# Patient Record
Sex: MALE | Race: WHITE | Employment: FULL TIME | URBAN - METROPOLITAN AREA
[De-identification: names, ages, dates, MRNs, and addresses within clinical notes are randomized per-mention and may not be internally consistent; named-entity substitution may affect disease eponyms.]

---

## 2017-05-09 ENCOUNTER — APPOINTMENT (OUTPATIENT)
Dept: OTHER | Facility: HOSPITAL | Age: 42
End: 2017-05-09
Attending: ORTHOPAEDIC SURGERY

## 2017-05-17 ENCOUNTER — OFFICE VISIT (OUTPATIENT)
Dept: SURGERY | Facility: CLINIC | Age: 42
End: 2017-05-17

## 2017-05-17 VITALS
SYSTOLIC BLOOD PRESSURE: 120 MMHG | HEART RATE: 80 BPM | BODY MASS INDEX: 26.41 KG/M2 | WEIGHT: 195 LBS | RESPIRATION RATE: 16 BRPM | DIASTOLIC BLOOD PRESSURE: 80 MMHG | HEIGHT: 72 IN

## 2017-05-17 DIAGNOSIS — M50.10 HERNIATION OF CERVICAL INTERVERTEBRAL DISC WITH RADICULOPATHY: Primary | ICD-10-CM

## 2017-05-17 PROCEDURE — 99204 OFFICE O/P NEW MOD 45 MIN: CPT | Performed by: PHYSICIAN ASSISTANT

## 2017-05-17 RX ORDER — ASCORBIC ACID 500 MG
500 TABLET ORAL DAILY
COMMUNITY

## 2017-05-17 RX ORDER — METHYLPREDNISOLONE 4 MG/1
TABLET ORAL
Qty: 1 PACKAGE | Refills: 0 | Status: SHIPPED | OUTPATIENT
Start: 2017-05-17 | End: 2017-06-01 | Stop reason: ALTCHOICE

## 2017-05-17 NOTE — PROGRESS NOTES
Location of Pain: neck, r shoulder, radiating down arm into fingers    Date Pain Began: Jan 2017         Work Related:   No        Receiving Work Comp/Disability:   Yes    Numeric Rating Scale:  Pain at Present:

## 2017-05-17 NOTE — PATIENT INSTRUCTIONS
Refill policies:    • Allow 2 business days for refills; controlled substances may take longer.   • Contact your pharmacy at least 5 days prior to running out of medication and have them send an electronic request or submit request through the “request re insurance carrier to obtain pre-certification or prior authorization. Unfortunately, [facility] has seen an increase in denial of payment even though the procedure/test has been pre-certified.   You are strongly encouraged to contact your insurance carrier to v

## 2017-05-17 NOTE — H&P
[facility] Neurosurgery Consultation      HISTORY OF PRESENT Kristin Patton is a 37year old left handed male here for a cervical consultation. Gives a history of working for a [email or web address].   He was doing a mandatory treadmill stress angie reports that he has never smoked. He does not have any smokeless tobacco history on file. He reports that he does not use illicit drugs.     ALLERGIES:    Penicillins                 MEDICATIONS:    Current Outpatient Prescriptions on File Prior to Visit: IMAGING:  MRI cervical spine 4/5/2017        ASSESSMENT:  1. Acute right C6 radiculopathy secondary to an injury January 24, 2017  2. Right C5-6 disc herniation and disc osteophyte complex    PLAN:  1. Off work  2. Medrol Dosepak  3.   Physical therapy

## 2017-05-22 ENCOUNTER — TELEPHONE (OUTPATIENT)
Dept: SURGERY | Facility: CLINIC | Age: 42
End: 2017-05-22

## 2017-05-24 ENCOUNTER — HOSPITAL ENCOUNTER (OUTPATIENT)
Dept: GENERAL RADIOLOGY | Facility: HOSPITAL | Age: 42
Discharge: HOME | End: 2017-05-24
Attending: PHYSICIAN ASSISTANT
Payer: OTHER MISCELLANEOUS

## 2017-05-24 DIAGNOSIS — M50.10 HERNIATION OF CERVICAL INTERVERTEBRAL DISC WITH RADICULOPATHY: ICD-10-CM

## 2017-05-24 PROCEDURE — 72052 X-RAY EXAM NECK SPINE 6/>VWS: CPT | Performed by: PHYSICIAN ASSISTANT

## 2017-06-01 ENCOUNTER — TELEPHONE (OUTPATIENT)
Dept: SURGERY | Facility: CLINIC | Age: 42
End: 2017-06-01

## 2017-06-01 ENCOUNTER — OFFICE VISIT (OUTPATIENT)
Dept: SURGERY | Facility: CLINIC | Age: 42
End: 2017-06-01

## 2017-06-01 VITALS — RESPIRATION RATE: 16 BRPM | HEART RATE: 88 BPM | SYSTOLIC BLOOD PRESSURE: 120 MMHG | DIASTOLIC BLOOD PRESSURE: 80 MMHG

## 2017-06-01 DIAGNOSIS — M54.12 CERVICAL RADICULOPATHY: Primary | ICD-10-CM

## 2017-06-01 PROCEDURE — 99213 OFFICE O/P EST LOW 20 MIN: CPT | Performed by: PHYSICIAN ASSISTANT

## 2017-06-01 RX ORDER — OMEGA-3 FATTY ACIDS/FISH OIL 300-1000MG
200 CAPSULE ORAL DAILY PRN
COMMUNITY

## 2017-06-01 NOTE — PATIENT INSTRUCTIONS
Refill policies:    • Allow 2-3 business days for refills; controlled substances may take longer.   • Contact your pharmacy at least 5 days prior to running out of medication and have them send an electronic request or submit request through the [facility] have a procedure or additional testing performed. [facility]) will contact your insurance carrier to obtain pre-certification or prior authorization.     Unfortunately, [facility] has seen an increase in denial of payment even though the p

## 2017-06-01 NOTE — PROGRESS NOTES
Neurosurgery Clinic Visit  2017    Abdulaziz Sung     1975 MRN [id number]       CC: Neck Pain    HPI:    Patient improving with PT.   He has tried manual traction and medrol as well, not sure what is helping the most.  Pain is more isolated in th She had an MRI of his cervical spine but no x-rays.  He has not had any oral steroids, cervical traction, or epidurals.  He recently was taken off of work. PAST MEDICAL HISTORY:  History reviewed. No pertinent past medical history.     PAST SURGICAL HIST    Iliopsoas   Hamstrings    Quads     D-flexion  P-flexion  Eversion    Right        5          5        5          5  5      Left        5          5        5          5  5        Reflexes DTRs:       Biceps    Brachioradialis    Triceps     Patellar

## 2017-06-02 ENCOUNTER — TELEPHONE (OUTPATIENT)
Dept: SURGERY | Facility: CLINIC | Age: 42
End: 2017-06-02

## 2017-06-05 ENCOUNTER — TELEPHONE (OUTPATIENT)
Dept: SURGERY | Facility: CLINIC | Age: 42
End: 2017-06-05

## 2017-06-26 ENCOUNTER — TELEPHONE (OUTPATIENT)
Dept: SURGERY | Facility: CLINIC | Age: 42
End: 2017-06-26

## 2017-06-26 ENCOUNTER — OFFICE VISIT (OUTPATIENT)
Dept: SURGERY | Facility: CLINIC | Age: 42
End: 2017-06-26

## 2017-06-26 VITALS — DIASTOLIC BLOOD PRESSURE: 80 MMHG | SYSTOLIC BLOOD PRESSURE: 124 MMHG | HEART RATE: 68 BPM

## 2017-06-26 DIAGNOSIS — M54.12 CERVICAL RADICULOPATHY: Primary | ICD-10-CM

## 2017-06-26 PROCEDURE — 99213 OFFICE O/P EST LOW 20 MIN: CPT | Performed by: PHYSICIAN ASSISTANT

## 2017-06-26 NOTE — TELEPHONE ENCOUNTER
Letter reprinted, will discuss with PA about RTW with no restrictions.   Spoke with patient, he states RTW note is okay, the issue is the office visit note states return on trial basis which his employer will not accept  Transferred call to [place], [place] to s

## 2017-06-26 NOTE — PATIENT INSTRUCTIONS
Refill policies:    • Allow 2-3 business days for refills; controlled substances may take longer.   • Contact your pharmacy at least 5 days prior to running out of medication and have them send an electronic request or submit request through the [facility] have a procedure or additional testing performed. [facility]) will contact your insurance carrier to obtain pre-certification or prior authorization.     Unfortunately, [facility] has seen an increase in denial of payment even though the p

## 2017-06-26 NOTE — PROGRESS NOTES
Neurosurgery Clinic Visit  2017    Daria Aldana     1975 MRN [id number]       CC: Neck Pain    HPI: He returns today in follow-up. He has been in physical therapy, he has made objective gains in therapy.   Today he states his right-sided neck It is in this capacity that he is here for evaluation.  He states he has cervical pain which is a 2-6/10.  He has pain radiating into the right scapula.  He denies any pain into the left scapula.  He has pain it radiates down the lateral aspect of the righ NEUROLOGICAL:  This patient is alert and orientated x 3.  Speech fluent. Comprehension intact Face is symmetrical.     SPINE: Mild  neck pain on extension only. Sensation to light touch is intact bilateral in both arms and legs. No cervical spasms.  Gait i 3.  If he tolerates returning to work we will see him back for a reevaluation in 4 weeks. Patient voiced understanding and is in agreement with this plan. 4.  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   Follow-up in 1

## 2017-06-26 NOTE — PROGRESS NOTES
Pt states he is feeling better, numbness, tingling and pain radiating down right arm have all gone.   Some minor neck ache

## 2017-07-31 ENCOUNTER — OFFICE VISIT (OUTPATIENT)
Dept: SURGERY | Facility: CLINIC | Age: 42
End: 2017-07-31

## 2017-07-31 DIAGNOSIS — M54.12 CERVICAL RADICULOPATHY: Primary | ICD-10-CM

## 2017-07-31 DIAGNOSIS — M50.10 HERNIATION OF CERVICAL INTERVERTEBRAL DISC WITH RADICULOPATHY: ICD-10-CM

## 2017-07-31 PROCEDURE — 99212 OFFICE O/P EST SF 10 MIN: CPT | Performed by: PHYSICIAN ASSISTANT

## 2017-07-31 NOTE — PATIENT INSTRUCTIONS
Refill policies:    • Allow 2-3 business days for refills; controlled substances may take longer.   • Contact your pharmacy at least 5 days prior to running out of medication and have them send an electronic request or submit request through the [facility] have a procedure or additional testing performed. [facility]) will contact your insurance carrier to obtain pre-certification or prior authorization.     Unfortunately, [facility] has seen an increase in denial of payment even though the p

## 2017-07-31 NOTE — PROGRESS NOTES
Neurosurgery Clinic Visit      Douglas Luke     1975 MRN [id number]       HPI: He returns today in follow-up. Since his last visit he did return to work full duty as a . He has had no trouble with his job.   He has been working out to The next morning he had very stiff neck then this proceeded into his neck pain right scapular pain and numbness and tingling down the right arm.     It is in this capacity that he is here for evaluation.  He states he has cervical pain which is a 2-6/10.  H GENERAL:  Patient is in no acute distress. HEENT:  Normocephalic, atraumatic  SKIN: Warm, dry, no rashes. NEUROLOGICAL:  This patient is alert and orientated x 3.  Speech fluent.  Comprehension intact Face is symmetrical.     SPINE: Mild  neck pain on e Christoph Chong M.S., PA-C  [facility]  [address] [facility], [address] [facility]  [address] [place] [address], [address] [place] [address] Rd  [phone]

## 2017-08-01 ENCOUNTER — TELEPHONE (OUTPATIENT)
Dept: SURGERY | Facility: CLINIC | Age: 42
End: 2017-08-01

## 2017-10-18 ENCOUNTER — TELEPHONE (OUTPATIENT)
Dept: SURGERY | Facility: CLINIC | Age: 42
End: 2017-10-18

## 2017-11-01 ENCOUNTER — OFFICE VISIT (OUTPATIENT)
Dept: SURGERY | Facility: CLINIC | Age: 42
End: 2017-11-01

## 2017-11-01 VITALS — HEART RATE: 64 BPM | SYSTOLIC BLOOD PRESSURE: 120 MMHG | DIASTOLIC BLOOD PRESSURE: 70 MMHG

## 2017-11-01 DIAGNOSIS — M50.10 HERNIATION OF CERVICAL INTERVERTEBRAL DISC WITH RADICULOPATHY: Primary | ICD-10-CM

## 2017-11-01 PROCEDURE — 99212 OFFICE O/P EST SF 10 MIN: CPT | Performed by: PHYSICIAN ASSISTANT

## 2017-11-01 NOTE — PROGRESS NOTES
Neurosurgery Clinic Visit      Paddy Culp     1975 MRN [id number]       HPI: He returns today in follow-up. He has returned to work as of his been exercising occasionally on his own.   Overall he is doing well he gets occasional achiness and sor Gives a history of working for [employer] He was doing a mandatory treadmill stress test on or about January 24, 2017.  While doing the stress test about 10 minutes into it he started getting a clicking in his neck and a popping he did n Current Outpatient Prescriptions on File Prior to Visit:  Multiple Vitamin (MULTI-VITAMIN OR)  None Entered  Disp:   Rfl:         PHYSICAL EXAMINATION: Last exam  GENERAL:  Patient is in no acute distress.   HEENT:  Normocephalic, atraumatic  SKIN: Warm,

## 2017-11-01 NOTE — PATIENT INSTRUCTIONS
Refill policies:    • Allow 2-3 business days for refills; controlled substances may take longer.   • Contact your pharmacy at least 5 days prior to running out of medication and have them send an electronic request or submit request through the [facility] have a procedure or additional testing performed. [facility]) will contact your insurance carrier to obtain pre-certification or prior authorization.     Unfortunately, [facility] has seen an increase in denial of payment even though the p

## 2020-01-02 ENCOUNTER — HOSPITAL ENCOUNTER (OUTPATIENT)
Dept: CT IMAGING | Facility: HOSPITAL | Age: 45
Discharge: HOME | End: 2020-01-02
Attending: INTERNAL MEDICINE

## 2020-01-02 DIAGNOSIS — Z13.9 ENCOUNTER FOR SCREENING: ICD-10-CM

## 2023-06-12 ENCOUNTER — LAB ENCOUNTER (OUTPATIENT)
Dept: LAB | Age: 48
End: 2023-06-12
Attending: FAMILY MEDICINE
Payer: COMMERCIAL

## 2023-06-12 ENCOUNTER — OFFICE VISIT (OUTPATIENT)
Dept: FAMILY MEDICINE CLINIC | Facility: CLINIC | Age: 48
End: 2023-06-12
Payer: COMMERCIAL

## 2023-06-12 VITALS
DIASTOLIC BLOOD PRESSURE: 72 MMHG | OXYGEN SATURATION: 98 % | HEART RATE: 81 BPM | TEMPERATURE: 98 F | HEIGHT: 72 IN | BODY MASS INDEX: 26.01 KG/M2 | WEIGHT: 192 LBS | RESPIRATION RATE: 16 BRPM | SYSTOLIC BLOOD PRESSURE: 122 MMHG

## 2023-06-12 DIAGNOSIS — Z00.00 WELLNESS EXAMINATION: ICD-10-CM

## 2023-06-12 DIAGNOSIS — K46.9 HERNIA: ICD-10-CM

## 2023-06-12 DIAGNOSIS — D22.9 SKIN MOLE: ICD-10-CM

## 2023-06-12 DIAGNOSIS — G89.29 CHRONIC BILATERAL LOW BACK PAIN WITHOUT SCIATICA: ICD-10-CM

## 2023-06-12 DIAGNOSIS — M54.50 CHRONIC BILATERAL LOW BACK PAIN WITHOUT SCIATICA: ICD-10-CM

## 2023-06-12 DIAGNOSIS — R39.198 DECREASED URINE STREAM: Primary | ICD-10-CM
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
## 2023-06-12 DIAGNOSIS — Z12.11 SCREENING FOR COLON CANCER: ICD-10-CM

## 2023-06-12 DIAGNOSIS — R39.198 DECREASED URINE STREAM: ICD-10-CM

## 2023-06-12 LAB
ALBUMIN SERPL-MCNC: 3.9 G/DL (ref 3.4–5)
ALBUMIN/GLOB SERPL: 1.2 {RATIO} (ref 1–2)
ALP LIVER SERPL-CCNC: 60 U/L
ALT SERPL-CCNC: 32 U/L
ANION GAP SERPL CALC-SCNC: 2 MMOL/L (ref 0–18)
APPEARANCE: CLEAR
AST SERPL-CCNC: 31 U/L (ref 15–37)
BASOPHILS # BLD AUTO: 0.03 X10(3) UL (ref 0–0.2)
BASOPHILS NFR BLD AUTO: 0.7 %
BILIRUB SERPL-MCNC: 0.4 MG/DL (ref 0.1–2)
BILIRUBIN: NEGATIVE
BUN BLD-MCNC: 14 MG/DL (ref 7–18)
CALCIUM BLD-MCNC: 9.2 MG/DL (ref 8.5–10.1)
CHLORIDE SERPL-SCNC: 107 MMOL/L (ref 98–112)
CHOLEST SERPL-MCNC: 172 MG/DL (ref ?–200)
CO2 SERPL-SCNC: 28 MMOL/L (ref 21–32)
COMPLEXED PSA SERPL-MCNC: 0.43 NG/ML (ref ?–4)
CREAT BLD-MCNC: 1.22 MG/DL
EOSINOPHIL # BLD AUTO: 0.04 X10(3) UL (ref 0–0.7)
EOSINOPHIL NFR BLD AUTO: 0.9 %
ERYTHROCYTE [DISTWIDTH] IN BLOOD BY AUTOMATED COUNT: 12.2 %
FASTING PATIENT LIPID ANSWER: NO
FASTING STATUS PATIENT QL REPORTED: NO
GFR SERPLBLD BASED ON 1.73 SQ M-ARVRAT: 74 ML/MIN/1.73M2 (ref 60–?)
GLOBULIN PLAS-MCNC: 3.3 G/DL (ref 2.8–4.4)
GLUCOSE (URINE DIPSTICK): NEGATIVE MG/DL
GLUCOSE BLD-MCNC: 96 MG/DL (ref 70–99)
HCT VFR BLD AUTO: 45.3 %
HDLC SERPL-MCNC: 53 MG/DL (ref 40–59)
HGB BLD-MCNC: 14.8 G/DL
IMM GRANULOCYTES # BLD AUTO: 0.01 X10(3) UL (ref 0–1)
IMM GRANULOCYTES NFR BLD: 0.2 %
KETONES (URINE DIPSTICK): NEGATIVE MG/DL
LDLC SERPL CALC-MCNC: 103 MG/DL (ref ?–100)
LEUKOCYTES: NEGATIVE
LYMPHOCYTES # BLD AUTO: 1.28 X10(3) UL (ref 1–4)
LYMPHOCYTES NFR BLD AUTO: 28.8 %
MCH RBC QN AUTO: 29.9 PG (ref 26–34)
MCHC RBC AUTO-ENTMCNC: 32.7 G/DL (ref 31–37)
MCV RBC AUTO: 91.5 FL
MONOCYTES # BLD AUTO: 0.32 X10(3) UL (ref 0.1–1)
MONOCYTES NFR BLD AUTO: 7.2 %
NEUTROPHILS # BLD AUTO: 2.77 X10 (3) UL (ref 1.5–7.7)
NEUTROPHILS # BLD AUTO: 2.77 X10(3) UL (ref 1.5–7.7)
NEUTROPHILS NFR BLD AUTO: 62.2 %
NITRITE, URINE: NEGATIVE
NONHDLC SERPL-MCNC: 119 MG/DL (ref ?–130)
OCCULT BLOOD: NEGATIVE
OSMOLALITY SERPL CALC.SUM OF ELEC: 284 MOSM/KG (ref 275–295)
PH, URINE: 7 (ref 4.5–8)
PLATELET # BLD AUTO: 161 10(3)UL (ref 150–450)
POTASSIUM SERPL-SCNC: 4.3 MMOL/L (ref 3.5–5.1)
PROT SERPL-MCNC: 7.2 G/DL (ref 6.4–8.2)
PROTEIN (URINE DIPSTICK): NEGATIVE MG/DL
RBC # BLD AUTO: 4.95 X10(6)UL
SODIUM SERPL-SCNC: 137 MMOL/L (ref 136–145)
SPECIFIC GRAVITY: 1.02 (ref 1–1.03)
TRIGL SERPL-MCNC: 84 MG/DL (ref 30–149)
TSI SER-ACNC: 1.56 MIU/ML (ref 0.36–3.74)
URINE-COLOR: YELLOW
UROBILINOGEN,SEMI-QN: 0.2 MG/DL (ref 0–1.9)
VIT D+METAB SERPL-MCNC: 37.3 NG/ML (ref 30–100)
VLDLC SERPL CALC-MCNC: 14 MG/DL (ref 0–30)
WBC # BLD AUTO: 4.5 X10(3) UL (ref 4–11)

## 2023-06-12 PROCEDURE — 84443 ASSAY THYROID STIM HORMONE: CPT

## 2023-06-12 PROCEDURE — 3078F DIAST BP <80 MM HG: CPT | Performed by: FAMILY MEDICINE

## 2023-06-12 PROCEDURE — 80061 LIPID PANEL: CPT

## 2023-06-12 PROCEDURE — 83036 HEMOGLOBIN GLYCOSYLATED A1C: CPT

## 2023-06-12 PROCEDURE — 3074F SYST BP LT 130 MM HG: CPT | Performed by: FAMILY MEDICINE

## 2023-06-12 PROCEDURE — 82306 VITAMIN D 25 HYDROXY: CPT

## 2023-06-12 PROCEDURE — 87086 URINE CULTURE/COLONY COUNT: CPT | Performed by: FAMILY MEDICINE

## 2023-06-12 PROCEDURE — 36415 COLL VENOUS BLD VENIPUNCTURE: CPT

## 2023-06-12 PROCEDURE — 3008F BODY MASS INDEX DOCD: CPT | Performed by: FAMILY MEDICINE

## 2023-06-12 PROCEDURE — 99214 OFFICE O/P EST MOD 30 MIN: CPT | Performed by: FAMILY MEDICINE

## 2023-06-12 PROCEDURE — 85025 COMPLETE CBC W/AUTO DIFF WBC: CPT

## 2023-06-12 PROCEDURE — 80053 COMPREHEN METABOLIC PANEL: CPT

## 2023-06-13 LAB
EST. AVERAGE GLUCOSE BLD GHB EST-MCNC: 117 MG/DL (ref 68–126)
HBA1C MFR BLD: 5.7 % (ref ?–5.7)

## 2023-07-19 PROBLEM — Z12.11 SPECIAL SCREENING FOR MALIGNANT NEOPLASM OF COLON: Status: ACTIVE | Noted: 2023-07-19

## 2023-07-19 PROBLEM — K63.5 COLON POLYP: Status: ACTIVE | Noted: 2023-07-19

## 2023-07-19 PROBLEM — K64.8 INTERNAL HEMORRHOIDS: Status: ACTIVE | Noted: 2023-07-19

## 2023-07-20 PROCEDURE — 88305 TISSUE EXAM BY PATHOLOGIST: CPT | Performed by: INTERNAL MEDICINE

## 2024-02-09 ENCOUNTER — PATIENT MESSAGE (OUTPATIENT)
Dept: FAMILY MEDICINE CLINIC | Facility: CLINIC | Age: 49
End: 2024-02-09

## 2024-02-09 DIAGNOSIS — Z01.00 ENCOUNTER FOR VISION SCREENING: ICD-10-CM

## 2024-02-09 DIAGNOSIS — Z13.5 SCREENING FOR EYE CONDITION: Primary | ICD-10-CM

## 2024-02-09 NOTE — TELEPHONE ENCOUNTER
From: Antonio Mcpherson  To: Caleb Suarez  Sent: 2/9/2024 10:29 AM CST  Subject: Ophthalmology Referral    Looking for an ophthalmology referral. I made an appointment with [facility] on Feb 13th at 1:30.

## 2024-10-04 ENCOUNTER — OFFICE VISIT (OUTPATIENT)
Dept: FAMILY MEDICINE CLINIC | Facility: CLINIC | Age: 49
End: 2024-10-04
Payer: COMMERCIAL

## 2024-10-04 VITALS
BODY MASS INDEX: 26.12 KG/M2 | HEIGHT: 72 IN | OXYGEN SATURATION: 99 % | SYSTOLIC BLOOD PRESSURE: 130 MMHG | DIASTOLIC BLOOD PRESSURE: 70 MMHG | TEMPERATURE: 97 F | RESPIRATION RATE: 18 BRPM | HEART RATE: 72 BPM | WEIGHT: 192.81 LBS

## 2024-10-04 DIAGNOSIS — Z23 NEED FOR VACCINATION: ICD-10-CM

## 2024-10-04 DIAGNOSIS — K46.9 HERNIA: Primary | ICD-10-CM

## 2024-10-04 PROCEDURE — 3008F BODY MASS INDEX DOCD: CPT | Performed by: FAMILY MEDICINE

## 2024-10-04 PROCEDURE — 99214 OFFICE O/P EST MOD 30 MIN: CPT | Performed by: FAMILY MEDICINE

## 2024-10-04 PROCEDURE — 3078F DIAST BP <80 MM HG: CPT | Performed by: FAMILY MEDICINE

## 2024-10-04 PROCEDURE — 3075F SYST BP GE 130 - 139MM HG: CPT | Performed by: FAMILY MEDICINE

## 2024-10-04 NOTE — PROGRESS NOTES
Subjective:   Antonio Mcpherson is a 49 year old male who presents for Hernia (Patient is a  and when lifting a gurney he felt a pull on his left side and the noticed a lump the next day. Patient states that the area was smaller previously and now is bigger. It is reducible and not severely painful at this time    Immunization/Injection (Patient declines flu shot today.)     History/Other:    Chief Complaint Reviewed and Verified  Nursing Notes Reviewed and   Verified  Tobacco Reviewed  Allergies Reviewed  Medications Reviewed    Problem List Reviewed  Medical History Reviewed  Surgical History   Reviewed  Family History Reviewed  Social History Reviewed         Tobacco:  He has never smoked tobacco.    Current Outpatient Medications   Medication Sig Dispense Refill    Ibuprofen (ADVIL) 200 MG Oral Cap Take 1 capsule (200 mg total) by mouth daily as needed.      Vitamin C 500 MG Oral Tab Take 1 tablet (500 mg total) by mouth daily.      Multiple Vitamin (MULTI-VITAMIN OR) None Entered           Review of Systems:  Review of Systems   Constitutional: Negative.    Respiratory: Negative.     Cardiovascular: Negative.    Gastrointestinal:         Possible inguinal hernia.   Skin: Negative.    Neurological: Negative.      Objective:   /70 (BP Location: Left arm, Patient Position: Sitting, Cuff Size: adult)   Pulse 72   Temp 97.1 °F (36.2 °C) (Temporal)   Resp 18   Ht 6' (1.829 m)   Wt 192 lb 12.8 oz (87.5 kg)   SpO2 99%   BMI 26.15 kg/m²  Estimated body mass index is 26.15 kg/m² as calculated from the following:    Height as of this encounter: 6' (1.829 m).    Weight as of this encounter: 192 lb 12.8 oz (87.5 kg).  Physical Exam  Constitutional:       Appearance: Normal appearance. He is well-developed.   HENT:      Head: Normocephalic and atraumatic.      Nose: No congestion or rhinorrhea.      Mouth/Throat:      Mouth: Mucous membranes are moist.      Pharynx: Oropharynx is clear.   Eyes:       Conjunctiva/sclera: Conjunctivae normal.   Cardiovascular:      Rate and Rhythm: Normal rate.   Pulmonary:      Effort: Pulmonary effort is normal.   Abdominal:      Palpations: Abdomen is soft.      Tenderness: There is abdominal tenderness.      Hernia: A hernia is present.   Musculoskeletal:         General: Normal range of motion.      Cervical back: Normal range of motion.   Skin:     General: Skin is warm and dry.   Neurological:      Mental Status: He is alert and oriented to person, place, and time.   Psychiatric:         Mood and Affect: Mood normal.         Behavior: Behavior normal.         Thought Content: Thought content normal.         Judgment: Judgment normal.       Assessment & Plan:   1. Hernia (Primary)  -     Refer to General Surgery  2. Need for vaccination  -     Fluzone trivalent vaccine, PF 0.5mL, 6mo+ (99398)      DENISE Beltran, 10/4/2024, 12:38 PM

## 2024-10-08 ENCOUNTER — TELEPHONE (OUTPATIENT)
Dept: FAMILY MEDICINE CLINIC | Facility: CLINIC | Age: 49
End: 2024-10-08

## 2024-10-09 ENCOUNTER — TELEPHONE (OUTPATIENT)
Dept: FAMILY MEDICINE CLINIC | Facility: CLINIC | Age: 49
End: 2024-10-09

## 2024-10-09 NOTE — TELEPHONE ENCOUNTER
Received request from BRENDA, [address], [address], [place] [address], with phone [phone] and fax [phone]. Request is for all billing statement and notes in regards to patient's work injury. Faxed to [facility] [employer] for processing.

## 2024-10-17 NOTE — TELEPHONE ENCOUNTER
Left voicemail for patient advising he has a NP  appointment scheduled on 10/30/24 with Dr Courtney so forms would be placed on hold until then. Advised patient to call forms department after appointment to provide details.

## 2024-10-17 NOTE — TELEPHONE ENCOUNTER
[facility] medical records request and duty status report form via fax in the forms department. Logged for processing and Therio message sent for Release of Information.

## 2024-10-30 ENCOUNTER — OFFICE VISIT (OUTPATIENT)
Facility: LOCATION | Age: 49
End: 2024-10-30
Payer: OTHER MISCELLANEOUS

## 2024-10-30 VITALS
SYSTOLIC BLOOD PRESSURE: 118 MMHG | WEIGHT: 185 LBS | BODY MASS INDEX: 25.06 KG/M2 | HEIGHT: 72 IN | OXYGEN SATURATION: 98 % | HEART RATE: 80 BPM | TEMPERATURE: 97 F | DIASTOLIC BLOOD PRESSURE: 76 MMHG

## 2024-10-30 DIAGNOSIS — K40.20 BILATERAL INGUINAL HERNIA WITHOUT OBSTRUCTION OR GANGRENE, RECURRENCE NOT SPECIFIED: Primary | ICD-10-CM

## 2024-10-30 PROCEDURE — 99203 OFFICE O/P NEW LOW 30 MIN: CPT | Performed by: STUDENT IN AN ORGANIZED HEALTH CARE EDUCATION/TRAINING PROGRAM

## 2024-10-30 NOTE — H&P
New Patient Visit Note       Active Problems      No diagnosis found.    Chief Complaint   Chief Complaint   Patient presents with    New Patient     NP - Hernia left pelvic area, workman's comp,        History of Present Illness   49 year old male who is here for evaluation of a painful bulge in the left groin. 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 He reports noting a protruding bulge in the left groin that is larger when lifting and bearing down. He reports dull soreness and discomfort in the left groin. He denies any symptoms in the right groin. He has no prior abdominal surgery history.       Allergies  Antonio is allergic to penicillins.    Past Medical / Surgical / Social / Family History    The past medical and past surgical history have been reviewed by me today.    Past Medical History:    Abdominal hernia    Back pain    Stress     Past Surgical History:   Procedure Laterality Date    Colonoscopy      Vasectomy  12/04/2014    Dr. Dye       The family history and social history have been reviewed by me today.    Family History   Problem Relation Age of Onset    Diabetes Mother      Social History     Socioeconomic History    Marital status: Single   Tobacco Use    Smoking status: Never    Smokeless tobacco: Never   Vaping Use    Vaping status: Never Used   Substance and Sexual Activity    Alcohol use: Yes     Alcohol/week: 1.0 standard drink of alcohol     Types: 1 Standard drinks or equivalent per week    Drug use: No   Other Topics Concern    Caffeine Concern No    Stress Concern No    Weight Concern No    Special Diet No    Exercise Yes    Seat Belt Yes        Current Outpatient Medications:     Ibuprofen (ADVIL) 200 MG Oral Cap, Take 1 capsule (200 mg total) by mouth daily as needed., Disp: , Rfl:     Vitamin C 500 MG Oral Tab, Take 1 tablet (500 mg total) by mouth daily., Disp: , Rfl:     Multiple Vitamin (MULTI-VITAMIN OR),  None Entered, Disp: , Rfl:       Review of Systems  The Review of Systems has been reviewed by me during today.  Review of Systems   Constitutional:  Negative for chills, diaphoresis, fatigue and fever.   HENT:  Negative for ear discharge, ear pain and sore throat.    Eyes:  Negative for pain and discharge.   Respiratory:  Negative for cough, chest tightness and shortness of breath.    Cardiovascular:  Negative for chest pain, palpitations and leg swelling.   Gastrointestinal:  Negative for abdominal distention, abdominal pain, blood in stool, constipation, diarrhea, nausea and vomiting.   Genitourinary:  Negative for dysuria, frequency, hematuria and urgency.   Skin:  Negative for color change, pallor and rash.   Neurological:  Negative for weakness, light-headedness, numbness and headaches.   Hematological:  Negative for adenopathy. Does not bruise/bleed easily.   Psychiatric/Behavioral:  Negative for agitation and confusion.        Physical Findings   /76 (BP Location: Left arm, Patient Position: Sitting, Cuff Size: adult)   Pulse 80   Temp 97.4 °F (36.3 °C) (Temporal)   Ht 72\"   Wt 185 lb (83.9 kg)   SpO2 98%   BMI 25.09 kg/m²   Physical Exam  Constitutional:       Appearance: Normal appearance.   HENT:      Head: Normocephalic and atraumatic.   Cardiovascular:      Pulses: Normal pulses.   Pulmonary:      Effort: Pulmonary effort is normal.   Abdominal:          Comments: Left inguinal hernia, reducible.   Skin:     General: Skin is warm.      Capillary Refill: Capillary refill takes less than 2 seconds.   Neurological:      Mental Status: He is alert and oriented to person, place, and time. Mental status is at baseline.             Assessment/Plan  No diagnosis found.    Antonio Mcpherson is a 49 year old male referred by Caleb Suarez MD for evaluation of left sided painful bulge. On exam he has an inguinal hernia. This has worsened after lifting a heavy stretcher at work last week. The hernia is non  recurrent and is reducible. I discussed with him the treatment options and the alternatives to surgical repair. I recommend proceeding with robotic left inguinal hernia repair. I discussed with him the risks and benefits of surgical repair in detail. Patient agrees to proceed with surgery.               Bessy Courtney MD

## 2024-11-01 ENCOUNTER — TELEPHONE (OUTPATIENT)
Facility: LOCATION | Age: 49
End: 2024-11-01

## 2024-11-01 NOTE — TELEPHONE ENCOUNTER
WC from [employer] is only covering the robotic left inguinal hernia under the compensation claim.     They request to send itemized bills to [facility] [address] [place], [place], [address]. Can also fax the claim to [phone].       Sahra Mccracken is in charge of the case. Please fax operative report to [phone]. She can also be reached at the same number or email [email or web address]    Claim #: [id number]

## 2024-11-08 ENCOUNTER — HOSPITAL ENCOUNTER (OUTPATIENT)
Facility: HOSPITAL | Age: 49
Setting detail: HOSPITAL OUTPATIENT SURGERY
Discharge: HOME | End: 2024-11-08
Attending: STUDENT IN AN ORGANIZED HEALTH CARE EDUCATION/TRAINING PROGRAM | Admitting: STUDENT IN AN ORGANIZED HEALTH CARE EDUCATION/TRAINING PROGRAM
Payer: COMMERCIAL

## 2024-11-08 ENCOUNTER — ANESTHESIA EVENT (OUTPATIENT)
Dept: SURGERY | Facility: HOSPITAL | Age: 49
End: 2024-11-08
Payer: COMMERCIAL

## 2024-11-08 ENCOUNTER — ANESTHESIA (OUTPATIENT)
Dept: SURGERY | Facility: HOSPITAL | Age: 49
End: 2024-11-08
Payer: COMMERCIAL

## 2024-11-08 VITALS
HEIGHT: 72 IN | TEMPERATURE: 99 F | RESPIRATION RATE: 16 BRPM | WEIGHT: 192 LBS | BODY MASS INDEX: 26.01 KG/M2 | SYSTOLIC BLOOD PRESSURE: 143 MMHG | DIASTOLIC BLOOD PRESSURE: 89 MMHG | HEART RATE: 89 BPM | OXYGEN SATURATION: 98 %

## 2024-11-08 DIAGNOSIS — G89.18 POST-OPERATIVE PAIN: Primary | ICD-10-CM

## 2024-11-08 PROCEDURE — 49650 LAP ING HERNIA REPAIR INIT: CPT

## 2024-11-08 PROCEDURE — 0YU64JZ SUPPLEMENT LEFT INGUINAL REGION WITH SYNTHETIC SUBSTITUTE, PERCUTANEOUS ENDOSCOPIC APPROACH: ICD-10-PCS | Performed by: STUDENT IN AN ORGANIZED HEALTH CARE EDUCATION/TRAINING PROGRAM

## 2024-11-08 PROCEDURE — S2900 ROBOTIC SURGICAL SYSTEM: HCPCS | Performed by: STUDENT IN AN ORGANIZED HEALTH CARE EDUCATION/TRAINING PROGRAM

## 2024-11-08 PROCEDURE — 8E0W4CZ ROBOTIC ASSISTED PROCEDURE OF TRUNK REGION, PERCUTANEOUS ENDOSCOPIC APPROACH: ICD-10-PCS | Performed by: STUDENT IN AN ORGANIZED HEALTH CARE EDUCATION/TRAINING PROGRAM

## 2024-11-08 PROCEDURE — 49650 LAP ING HERNIA REPAIR INIT: CPT | Performed by: STUDENT IN AN ORGANIZED HEALTH CARE EDUCATION/TRAINING PROGRAM

## 2024-11-08 DEVICE — LAPAROSCOPIC SELF-FIXATING MESH POLYESTER WITH POLYLACTIC ACID GRIPS AND COLLAGEN FILM
Type: IMPLANTABLE DEVICE | Status: FUNCTIONAL
Brand: PROGRIP

## 2024-11-08 RX ORDER — NALOXONE HYDROCHLORIDE 0.4 MG/ML
0.08 INJECTION, SOLUTION INTRAMUSCULAR; INTRAVENOUS; SUBCUTANEOUS AS NEEDED
Status: DISCONTINUED | OUTPATIENT
Start: 2024-11-08 | End: 2024-11-08

## 2024-11-08 RX ORDER — HEPARIN SODIUM 5000 [USP'U]/ML
INJECTION, SOLUTION INTRAVENOUS; SUBCUTANEOUS
Status: COMPLETED
Start: 2024-11-08 | End: 2024-11-08

## 2024-11-08 RX ORDER — KETOROLAC TROMETHAMINE 30 MG/ML
INJECTION, SOLUTION INTRAMUSCULAR; INTRAVENOUS AS NEEDED
Status: DISCONTINUED | OUTPATIENT
Start: 2024-11-08 | End: 2024-11-08 | Stop reason: SURG

## 2024-11-08 RX ORDER — ONDANSETRON 2 MG/ML
INJECTION INTRAMUSCULAR; INTRAVENOUS AS NEEDED
Status: DISCONTINUED | OUTPATIENT
Start: 2024-11-08 | End: 2024-11-08 | Stop reason: SURG

## 2024-11-08 RX ORDER — BUPIVACAINE HYDROCHLORIDE 2.5 MG/ML
INJECTION, SOLUTION EPIDURAL; INFILTRATION; INTRACAUDAL AS NEEDED
Status: DISCONTINUED | OUTPATIENT
Start: 2024-11-08 | End: 2024-11-08 | Stop reason: HOSPADM

## 2024-11-08 RX ORDER — PROCHLORPERAZINE EDISYLATE 5 MG/ML
5 INJECTION INTRAMUSCULAR; INTRAVENOUS EVERY 8 HOURS PRN
Status: DISCONTINUED | OUTPATIENT
Start: 2024-11-08 | End: 2024-11-08

## 2024-11-08 RX ORDER — NEOSTIGMINE METHYLSULFATE 1 MG/ML
INJECTION INTRAVENOUS AS NEEDED
Status: DISCONTINUED | OUTPATIENT
Start: 2024-11-08 | End: 2024-11-08 | Stop reason: SURG

## 2024-11-08 RX ORDER — HYDROMORPHONE HYDROCHLORIDE 1 MG/ML
0.6 INJECTION, SOLUTION INTRAMUSCULAR; INTRAVENOUS; SUBCUTANEOUS EVERY 5 MIN PRN
Status: DISCONTINUED | OUTPATIENT
Start: 2024-11-08 | End: 2024-11-08

## 2024-11-08 RX ORDER — ACETAMINOPHEN 500 MG
1000 TABLET ORAL ONCE
Status: DISCONTINUED | OUTPATIENT
Start: 2024-11-08 | End: 2024-11-08 | Stop reason: HOSPADM

## 2024-11-08 RX ORDER — HYDROMORPHONE HYDROCHLORIDE 1 MG/ML
0.2 INJECTION, SOLUTION INTRAMUSCULAR; INTRAVENOUS; SUBCUTANEOUS EVERY 5 MIN PRN
Status: DISCONTINUED | OUTPATIENT
Start: 2024-11-08 | End: 2024-11-08

## 2024-11-08 RX ORDER — DEXAMETHASONE SODIUM PHOSPHATE 4 MG/ML
VIAL (ML) INJECTION AS NEEDED
Status: DISCONTINUED | OUTPATIENT
Start: 2024-11-08 | End: 2024-11-08 | Stop reason: SURG

## 2024-11-08 RX ORDER — SODIUM CHLORIDE, SODIUM LACTATE, POTASSIUM CHLORIDE, CALCIUM CHLORIDE 600; 310; 30; 20 MG/100ML; MG/100ML; MG/100ML; MG/100ML
INJECTION, SOLUTION INTRAVENOUS CONTINUOUS
Status: DISCONTINUED | OUTPATIENT
Start: 2024-11-08 | End: 2024-11-08

## 2024-11-08 RX ORDER — GLYCOPYRROLATE 0.2 MG/ML
INJECTION, SOLUTION INTRAMUSCULAR; INTRAVENOUS AS NEEDED
Status: DISCONTINUED | OUTPATIENT
Start: 2024-11-08 | End: 2024-11-08 | Stop reason: SURG

## 2024-11-08 RX ORDER — HYDROMORPHONE HYDROCHLORIDE 1 MG/ML
0.4 INJECTION, SOLUTION INTRAMUSCULAR; INTRAVENOUS; SUBCUTANEOUS EVERY 5 MIN PRN
Status: DISCONTINUED | OUTPATIENT
Start: 2024-11-08 | End: 2024-11-08

## 2024-11-08 RX ORDER — LIDOCAINE HYDROCHLORIDE 40 MG/ML
SOLUTION TOPICAL AS NEEDED
Status: DISCONTINUED | OUTPATIENT
Start: 2024-11-08 | End: 2024-11-08 | Stop reason: SURG

## 2024-11-08 RX ORDER — HYDROCODONE BITARTRATE AND ACETAMINOPHEN 5; 325 MG/1; MG/1
2 TABLET ORAL ONCE AS NEEDED
Status: COMPLETED | OUTPATIENT
Start: 2024-11-08 | End: 2024-11-08

## 2024-11-08 RX ORDER — HYDROCODONE BITARTRATE AND ACETAMINOPHEN 5; 325 MG/1; MG/1
1 TABLET ORAL ONCE AS NEEDED
Status: COMPLETED | OUTPATIENT
Start: 2024-11-08 | End: 2024-11-08

## 2024-11-08 RX ORDER — ROCURONIUM BROMIDE 10 MG/ML
INJECTION, SOLUTION INTRAVENOUS AS NEEDED
Status: DISCONTINUED | OUTPATIENT
Start: 2024-11-08 | End: 2024-11-08 | Stop reason: SURG

## 2024-11-08 RX ORDER — OXYCODONE HYDROCHLORIDE 5 MG/1
5 TABLET ORAL EVERY 6 HOURS PRN
Qty: 15 TABLET | Refills: 0 | Status: SHIPPED | OUTPATIENT
Start: 2024-11-08

## 2024-11-08 RX ORDER — ONDANSETRON 2 MG/ML
4 INJECTION INTRAMUSCULAR; INTRAVENOUS EVERY 6 HOURS PRN
Status: DISCONTINUED | OUTPATIENT
Start: 2024-11-08 | End: 2024-11-08

## 2024-11-08 RX ORDER — ACETAMINOPHEN 500 MG
1000 TABLET ORAL ONCE AS NEEDED
Status: COMPLETED | OUTPATIENT
Start: 2024-11-08 | End: 2024-11-08

## 2024-11-08 RX ORDER — LIDOCAINE HYDROCHLORIDE 10 MG/ML
INJECTION, SOLUTION EPIDURAL; INFILTRATION; INTRACAUDAL; PERINEURAL AS NEEDED
Status: DISCONTINUED | OUTPATIENT
Start: 2024-11-08 | End: 2024-11-08 | Stop reason: SURG

## 2024-11-08 RX ORDER — SCOLOPAMINE TRANSDERMAL SYSTEM 1 MG/1
1 PATCH, EXTENDED RELEASE TRANSDERMAL ONCE
Status: DISCONTINUED | OUTPATIENT
Start: 2024-11-08 | End: 2024-11-08 | Stop reason: HOSPADM

## 2024-11-08 RX ORDER — HEPARIN SODIUM 5000 [USP'U]/ML
5000 INJECTION, SOLUTION INTRAVENOUS; SUBCUTANEOUS ONCE
Status: COMPLETED | OUTPATIENT
Start: 2024-11-08 | End: 2024-11-08

## 2024-11-08 RX ORDER — MEPERIDINE HYDROCHLORIDE 25 MG/ML
12.5 INJECTION INTRAMUSCULAR; INTRAVENOUS; SUBCUTANEOUS AS NEEDED
Status: DISCONTINUED | OUTPATIENT
Start: 2024-11-08 | End: 2024-11-08

## 2024-11-08 RX ADMIN — KETOROLAC TROMETHAMINE 30 MG: 30 INJECTION, SOLUTION INTRAMUSCULAR; INTRAVENOUS at 12:40:00

## 2024-11-08 RX ADMIN — DEXAMETHASONE SODIUM PHOSPHATE 8 MG: 4 MG/ML VIAL (ML) INJECTION at 11:38:00

## 2024-11-08 RX ADMIN — ROCURONIUM BROMIDE 50 MG: 10 INJECTION, SOLUTION INTRAVENOUS at 11:28:00

## 2024-11-08 RX ADMIN — SODIUM CHLORIDE, SODIUM LACTATE, POTASSIUM CHLORIDE, CALCIUM CHLORIDE: 600; 310; 30; 20 INJECTION, SOLUTION INTRAVENOUS at 12:44:00

## 2024-11-08 RX ADMIN — SODIUM CHLORIDE, SODIUM LACTATE, POTASSIUM CHLORIDE, CALCIUM CHLORIDE: 600; 310; 30; 20 INJECTION, SOLUTION INTRAVENOUS at 11:27:00

## 2024-11-08 RX ADMIN — LIDOCAINE HYDROCHLORIDE 50 MG: 10 INJECTION, SOLUTION EPIDURAL; INFILTRATION; INTRACAUDAL; PERINEURAL at 11:28:00

## 2024-11-08 RX ADMIN — ONDANSETRON 4 MG: 2 INJECTION INTRAMUSCULAR; INTRAVENOUS at 12:40:00

## 2024-11-08 RX ADMIN — GLYCOPYRROLATE 0.4 MG: 0.2 INJECTION, SOLUTION INTRAMUSCULAR; INTRAVENOUS at 12:40:00

## 2024-11-08 RX ADMIN — LIDOCAINE HYDROCHLORIDE 4 ML: 40 SOLUTION TOPICAL at 11:28:00

## 2024-11-08 RX ADMIN — NEOSTIGMINE METHYLSULFATE 3.5 MG: 1 INJECTION INTRAVENOUS at 12:40:00

## 2024-11-08 NOTE — OPERATIVE REPORT
[facility]  Operative Note    Antonio Mcpherson Location: OR   CSN [id number] MRN [id number]    1975 Age 49 year old   Admission Date 2024 Operation Date 2024   Attending Physician Bessy Courtney MD Operating Physician Bessy Courtney MD   PCP Caleb Suarez MD          Patient Name: nAtonio Mcpherson    Preoperative Diagnosis: Bilateral inguinal hernia without obstruction or gangrene, recurrence not specified [K40.20]    Postoperative Diagnosis: Same as pre-op diagnosis.    Primary Surgeon: Bessy Courtney MD     Assistant: Deidre Webster PA-C    Anesthesia: General    Anesthesiologist: Anesthesiologist.: Neftali Rodriguez MD  CRNA.: Wilber Kaiser CRNA    Procedures: Robotic left inguinal hernia repair     Implants: Covidien ProGrip 12 cm x 16 cm     Specimen: none    Drains: None    Estimated Blood Loss: 5 mL     Complications: None    Condition: Good    Indications for Surgery:   Antonio Mcpherson is a 49 year old gentleman who presents with a painful enlarging bulge in the left groin. Physical exam shows a left inguinal hernia. The patient presents today for elective hernia repair.    Surgical Findings:   Left direct inguinal hernia    Description of Procedure:   The patient was transported to the operating room and placed on the operating table in supine position. General endotracheal anesthesia was administered. The abdomen and groins were clipped, prepped and draped in sterile fashion. Pre-operative antibiotics were given. A time-out was performed.      A stab incision was made in the left upper quadrant. A Veress needle was inserted. Pneumoperitoneum was achieved to a pressure of 15mm Hg. A transverse 8-mm supraumbilical incision was made and an 8mm trocar was placed and a laparoscope inserted. Diagnostic survey of the abdomen revealed no other acute pathology or iatrogenic injury. The inguinal region was examined and the hernia defect was identified. Two 8-mm trocars were placed on  either side of the abdomen in the midclavicular line under direct vision. The patient was placed in Trendelenburg position. The robot was then docked and instruments placed under direct vision.     The hernia contents were carefully reduced into the abdomen. The peritoneum was grasped, retracted, and incised approximately 5cm cephalad to the hernia defect. Blunt and sharp dissection were used with judicious electrocautery to create the pre-peritoneal pocket. Medial to lateral dissection was performed. We identifed initially the pubic tubercle and Segundo's ligament. Dissection was carried out to a level posterior to the pubic tubercle and across the midline. The soft tissue was dissected medial to the iliac vein clear the femoral space. Then the soft tissues were dissected laterally to create a large pre-peritoneal pocket to accommodate a mesh. Care was taken not to injure the femoral vessels, the epigastric vessels, or any nerve structures during dissection.    Blunt and sharp dissection were used to dissect the hernia sac away from the spermatic cord. Care was taken to not injure the vas deferens or the spermatic vessels. The peritoneal flap was dissected back sufficiently so that retraction on the flap did not manipulate the spermatic cord structures. The direct hernia sac was imbricated using a 2-0 V Loc suture to prevent seroma. A Covidien ProGrip 12 cm x 16 cm mesh was placed in our dissected pocket and we ensured adequate coverage of all potential hernia sites. The peritoneal flap was then reapproximated using running 2-0 V-Loc suture.       All instruments were removed and the robot undocked. All trocars were removed under direct visualization and pneumoperitoneum was released.  All wounds were cleansed and irrigated. The skin incisions were reapproximated using subcuticular 4-0 Monocryl suture. Local anesthetic was injected.  Skin glue was used to seal the incisions. The scrotum was palpated and the  testicles were retracted back into their anatomic position.    The patient's drapes were removed and the patient was awakened from anesthesia. They were then transported to the recovery room in stable condition. The patient tolerated the procedure well without apparent complication. All needle, sponge, instrument counts correct at the end of procedure.      Bessy Courtney MD   11/8/2024  12:44 PM

## 2024-11-08 NOTE — INTERVAL H&P NOTE
Pre-op Diagnosis: Bilateral inguinal hernia without obstruction or gangrene, recurrence not specified [K40.20]    The above referenced H&P was reviewed by Bessy Courtney MD on 11/8/2024, the patient was examined and no significant changes have occurred in the patient's condition since the H&P was performed.  I discussed with the patient and/or legal representative the potential benefits, risks and side effects of this procedure; the likelihood of the patient achieving goals; and potential problems that might occur during recuperation.  I discussed reasonable alternatives to the procedure, including risks, benefits and side effects related to the alternatives and risks related to not receiving this procedure.  We will proceed with procedure as planned.

## 2024-11-08 NOTE — ANESTHESIA PROCEDURE NOTES
Airway  Date/Time: 11/8/2024 11:30 AM  Urgency: elective      General Information and Staff    Patient location during procedure: OR  Anesthesiologist: Neftali Rodriguez MD  Resident/CRNA: Wilber Kaiser CRNA  Performed: CRNA   Performed by: Wilber Kaiser CRNA  Authorized by: Neftali Rodriguez MD      Indications and Patient Condition  Indications for airway management: anesthesia  Sedation level: deep  Preoxygenated: yes  Patient position: sniffing  Mask difficulty assessment: 1 - vent by mask    Final Airway Details  Final airway type: endotracheal airway      Successful airway: ETT  Cuffed: yes   Successful intubation technique: direct laryngoscopy  Endotracheal tube insertion site: oral  Blade: Marlene  Blade size: #4  ETT size (mm): 7.5    Cormack-Lehane Classification: grade I - full view of glottis  Placement verified by: capnometry   Measured from: lips  ETT to lips (cm): 23  Number of attempts at approach: 1

## 2024-11-08 NOTE — ANESTHESIA POSTPROCEDURE EVALUATION
[facility]    Antonio Mcpherson Patient Status:  Hospital Outpatient Surgery   Age/Gender 49 year old male MRN [id number]   Location [facility] POST ANESTHESIA CARE UNIT Attending Bessy Courtney MD   Hosp Day # 0 PCP Caleb Suarez MD       Anesthesia Post-op Note    ROBOTIC LEFT LAPAROSCOPIC INGUINAL HERNIA REPAIR WITH MESH    Procedure Summary       Date: 11/08/24 Room / Location:  MAIN OR 08 / [facility] MAIN OR    Anesthesia Start: 1121 Anesthesia Stop: 1304    Procedure: ROBOTIC LEFT LAPAROSCOPIC INGUINAL HERNIA REPAIR WITH MESH (Left) Diagnosis:       Bilateral inguinal hernia without obstruction or gangrene, recurrence not specified      (Bilateral inguinal hernia without obstruction or gangrene, recurrence not specified [K40.20])    Surgeons: Bessy Courtney MD Anesthesiologist: Neftali Rodriguez MD    Anesthesia Type: general ASA Status: 2            Anesthesia Type: general    Vitals Value Taken Time   /57 11/08/24 1306   Temp 97.5 °F (36.4 °C) 11/08/24 1305   Pulse 54 11/08/24 1307   Resp 16 11/08/24 1307   SpO2 100 % 11/08/24 1307   Vitals shown include unfiled device data.    Patient Location: PACU    Anesthesia Type: general    Airway Patency: patent    Postop Pain Control: adequate    Mental Status: mildly sedated but able to meaningfully participate in the post-anesthesia evaluation    Nausea/Vomiting: none    Cardiopulmonary/Hydration status: stable euvolemic    Complications: no apparent anesthesia related complications    Postop vital signs: stable    Dental Exam: Unchanged from Preop    Patient to be discharged from PACU when criteria met.

## 2024-11-08 NOTE — DISCHARGE INSTRUCTIONS
Home Care Instructions  Robot-Assisted Laparoscopic Inguinal Hernia Repair        WHAT TO EXPECT  You may feel pain at the incisions or where your hernia used to be. This is due to stitches placed during the surgery.    You may feel pain and bruising in the groin. You may notice swelling of the scrotum. This is common and will resolve.    You may feel pain in the shoulders. This is due to irritation of the diaphragm by the air used to inflate the abdomen.    You may feel a sore throat. This is due to the breathing tube used during surgery.     You may feel mild nausea and vomiting for the first 24 hours, this should resolve quickly.    You may have constipation, especially if taking narcotic pain medications. If you have not had a bowel movement by 48 hours after surgery, take Miralax 17g (one cap full) every 12 hours until you have a bowel movement. If another 24 hours goes by without a bowel movement, then take a dose of magnesium citrate or milk of magnesia.     MEDICATIONS  Take 2 Extra Strength Tylenol (1000mg every) 8 hours for pain. For the first 3 days it is best to take the Tylenol every 8 hours even if you do not feel much pain.     Take Advil (ibuprofen) 800mg every 8 hours or Alleve (naproxen) 500mg every 12 hours, also for the first 3 days.     For moderate to severe pain take one Oxycodone pill (5mg) every six hours as needed for pain. If you do not feel that narcotics are necessary you shouldn’t take them. If the pain is severe you can take two pills (10mg) every six hours.    Please ask your surgeon before resuming blood thinners such as Aspirin, Plavix, Coumadin, Warfarin, Eliquis, or Xarelto. All other home medications may be resumed as scheduled.     DIET  Start with a light and bland diet and slowly advance to regular food as your appetite improves. There are no specific food restrictions. Do not eat excessively. Eat small frequent meals.     Drink plenty of water. Try to eat a healthy high fiber  diet.    Do not drink alcohol (beer, wine, liquor) or use tobacco products.    WOUND CARE  The incisions are covered with Skin Glue. You can shower 24 hours after surgery and get the dressings wet.    The Skin Glue will stay on for 10 to 14 days after surgery.     Soap and water can get on the incisions but do not scrub the wounds. No hair dye or chemicals of any kind should get on the incisions.     Do not apply any topical ointments such as Neosporin or Hydrogen Peroxide.    Do not swim or submerge the incisions under water for 1 month.    ACTIVITY  Every day you should be up walking around the house. Do not lie in bed all day. Staying active prevents blood clots and pneumonia.    You can go up and down stairs. Do not lift more than 20 pounds or perform strenuous activity that requires straining the core muscles.    You may ride in a car but should not drive the car for at least one week.     APPOINTMENT  Please call our office at [phone] soon to make an appointment.    For questions or concerns please call our office between 8:30 a.m. and 5 p.m. Monday through Friday. The number above directs to the answering service after hours to reach the on-call physician.    Please call our office immediately for fever greater than 100.5, excess bleeding, inability to urinate, severe abdominal pain, severe diarrhea, uncontrollable vomiting.      For life threatening emergencies such as severe chest pain, difficulty breathing, or loss of conciousness call 911.          You can take more tylenol at 5 pm.

## 2024-11-08 NOTE — ANESTHESIA PREPROCEDURE EVALUATION
PRE-OP EVALUATION    Patient Name: Antonio Mcpherson    Admit Diagnosis: Bilateral inguinal hernia without obstruction or gangrene, recurrence not specified [K40.20]    Pre-op Diagnosis: Bilateral inguinal hernia without obstruction or gangrene, recurrence not specified [K40.20]    ROBOTIC LEFT, POSSIBLE RIGHT, LAPAROSCOPIC INGUINAL HERNIA REPAIR WITH MESH    Anesthesia Procedure: ROBOTIC LEFT, POSSIBLE RIGHT, LAPAROSCOPIC INGUINAL HERNIA REPAIR WITH MESH (Bilateral)    Surgeons and Role:     * Bessy Courtney MD - Primary    Pre-op vitals reviewed.  Temp: 98.3 °F (36.8 °C)  Pulse: 59  Resp: 16  BP: 134/92  SpO2: 100 %  Body mass index is 26.04 kg/m².    Current medications reviewed.  Hospital Medications:   [Transfer Hold] acetaminophen (Tylenol Extra Strength) tab 1,000 mg  1,000 mg Oral Once    [Transfer Hold] scopolamine (Transderm-Scop) 1 MG/3DAYS patch 1 patch  1 patch Transdermal Once    lactated ringers infusion   Intravenous Continuous    [COMPLETED] heparin (Porcine) 5000 UNIT/ML injection 5,000 Units  5,000 Units Subcutaneous Once    ceFAZolin (Ancef) 2g in 10mL IV syringe premix  2 g Intravenous Once    ceFAZolin (Ancef) 2 g/10mL IV syringe premix           Outpatient Medications:   Prescriptions Prior to Admission[1]    Allergies: Penicillins      Anesthesia Evaluation    Patient summary reviewed.    Anesthetic Complications  (-) history of anesthetic complications         GI/Hepatic/Renal                                 Cardiovascular                                                       Endo/Other                                  Pulmonary                           Neuro/Psych                 (+) neuromuscular disease             Cervical radiculopathy Colon polyp  Internal hemorrhoids Sensorineural hearing loss, bilateral  Special screening for malignant neoplasm of colon Vasectomy evaluation            Past Surgical History:   Procedure Laterality Date    Colonoscopy      Vasectomy  12/04/2014      Dye     Social History     Socioeconomic History    Marital status: Single   Tobacco Use    Smoking status: Never    Smokeless tobacco: Never   Vaping Use    Vaping status: Never Used   Substance and Sexual Activity    Alcohol use: Yes     Alcohol/week: 1.0 standard drink of alcohol     Types: 1 Standard drinks or equivalent per week     Comment: SOCIALLY    Drug use: No   Other Topics Concern    Caffeine Concern No    Stress Concern No    Weight Concern No    Special Diet No    Exercise Yes    Seat Belt Yes     History   Drug Use No     Available pre-op labs reviewed.               Airway      Mallampati: I  Mouth opening: >3 FB  TM distance: > 6 cm  Neck ROM: full Cardiovascular    Cardiovascular exam normal.         Dental    Dentition appears grossly intact         Pulmonary    Pulmonary exam normal.                 Other findings              ASA: 2   Plan: general  NPO status verified and           Plan/risks discussed with: patient                Present on Admission:  **None**             [1]   Medications Prior to Admission   Medication Sig Dispense Refill Last Dose/Taking    Ibuprofen (ADVIL) 200 MG Oral Cap Take 1 capsule (200 mg total) by mouth daily as needed.   Past Month    Vitamin C 500 MG Oral Tab Take 1 tablet (500 mg total) by mouth daily.   Past Month    Multiple Vitamin (MULTI-VITAMIN OR) None Entered   Past Month

## 2024-11-22 ENCOUNTER — OFFICE VISIT (OUTPATIENT)
Facility: LOCATION | Age: 49
End: 2024-11-22

## 2024-11-22 VITALS
HEART RATE: 84 BPM | TEMPERATURE: 97 F | DIASTOLIC BLOOD PRESSURE: 69 MMHG | SYSTOLIC BLOOD PRESSURE: 113 MMHG | OXYGEN SATURATION: 97 %

## 2024-11-22 DIAGNOSIS — Z98.890 POST-OPERATIVE STATE: Primary | ICD-10-CM

## 2024-11-22 PROCEDURE — 99024 POSTOP FOLLOW-UP VISIT: CPT | Performed by: PHYSICIAN ASSISTANT

## 2024-11-22 NOTE — PROGRESS NOTES
Post Operative Visit Note       Active Problems  1. Post-operative state         Chief Complaint   Chief Complaint   Patient presents with    Post-Op     PO - 11/8 w/ melony - ROBOTIC LEFT LAPAROSCOPIC INGUINAL HERNIA REPAIR WITH MESH, slight twinges when sitting down or when stretching, pressure pain, no other symptoms.            History of Present Illness   49 year old male who presents today for postoperative visit following robotic left laparoscopic inguinal hernia repair on 11/8/2024 by Dr. Courtney.    The patient states that since his surgery that he is overall doing very well.  He denies complaints today.  He denies nausea or vomiting.  He denies diarrhea or constipation.  He denies fever or chills.  he states that his incisions are healing well.  There is no redness or drainage noted.      Allergies  Antonio is allergic to penicillins.    Past Medical / Surgical / Social / Family History    The past medical and past surgical history have been reviewed by me today.     Past Medical History:    Abdominal hernia    Back pain    Back problem    CERVICAL AND LOWER BACK PAIN    Stress     Past Surgical History:   Procedure Laterality Date    Colonoscopy      Vasectomy  12/04/2014    Dr. Dye       The family history and social history have been reviewed by me today.    Family History   Problem Relation Age of Onset    Diabetes Mother      Social History     Socioeconomic History    Marital status: Single   Tobacco Use    Smoking status: Never    Smokeless tobacco: Never   Vaping Use    Vaping status: Never Used   Substance and Sexual Activity    Alcohol use: Yes     Alcohol/week: 1.0 standard drink of alcohol     Types: 1 Standard drinks or equivalent per week     Comment: SOCIALLY    Drug use: No   Other Topics Concern    Caffeine Concern No    Stress Concern No    Weight Concern No    Special Diet No    Exercise Yes    Seat Belt Yes        Current Outpatient Medications:     Ibuprofen (ADVIL) 200 MG Oral Cap,  Take 1 capsule (200 mg total) by mouth daily as needed., Disp: , Rfl:     Vitamin C 500 MG Oral Tab, Take 1 tablet (500 mg total) by mouth daily., Disp: , Rfl:     Multiple Vitamin (MULTI-VITAMIN OR), None Entered, Disp: , Rfl:       Review of Systems  A 10 point Review of Systems has been completed by me today and is negative except as above in the HPI.    Physical Findings   /69 (BP Location: Left arm, Patient Position: Sitting, Cuff Size: large)   Pulse 84   Temp 96.5 °F (35.8 °C) (Temporal)   SpO2 97%   Gen/psych: alert and oriented, cooperative, no apparent distress  Cardiovascular: regular rate  Respiratory: respirations unlabored, no wheeze  Abdominal: soft, non-tender, non-distended, no guarding/rebound  Incisions:  Incisions are healing well.  There is no redness or drainage noted.       Assessment/Plan  1. Post-operative state        The patient is doing well following robotic inguinal hernia repair with mesh.  He is to continue with diet as tolerated.  He is to continue with lifting restrictions of no more than 20 pounds for 6 weeks from the date of his surgery.  All questions or concerns were answered.  He is to return to work on 12/20/2024 without restrictions.  He is return to clinic as needed.     No orders of the defined types were placed in this encounter.       Imaging & Referrals   None    Follow Up  Return if symptoms worsen or fail to improve.    Sandy Maloney PA-C  [facility] General Surgery  11/22/2024  11:23 AM

## 2024-12-04 ENCOUNTER — TELEPHONE (OUTPATIENT)
Facility: LOCATION | Age: 49
End: 2024-12-04

## 2024-12-04 NOTE — TELEPHONE ENCOUNTER
Dr. Courtney/Sandy Black     Please sign off on form if you agree to: Fit for duty/Return to work     -Signature page will be the first page scanned  -From your Inbasket, Highlight the patient and click Chart   -Double click the 12/4/24 Forms Completion telephone encounter  -Scroll down to the Media section   -Click the blue Hyperlink: fit for duty 2 Dr. Sherwin Leon PA-C 12/4/24  -Click Acknowledge located in the top right ribbon/menu   -Drag the mouse into the blank space of the document and a + sign will appear. Left click to   electronically sign the document.  -Once signed, simply exit out of the screen and you signature will be saved.     Thank you,  Vee

## 2025-04-11 ENCOUNTER — OFFICE VISIT (OUTPATIENT)
Dept: FAMILY MEDICINE CLINIC | Facility: CLINIC | Age: 50
End: 2025-04-11
Payer: COMMERCIAL

## 2025-04-11 VITALS
SYSTOLIC BLOOD PRESSURE: 122 MMHG | TEMPERATURE: 97 F | OXYGEN SATURATION: 98 % | HEART RATE: 64 BPM | RESPIRATION RATE: 16 BRPM | WEIGHT: 195.25 LBS | HEIGHT: 72 IN | BODY MASS INDEX: 26.44 KG/M2 | DIASTOLIC BLOOD PRESSURE: 70 MMHG

## 2025-04-11 DIAGNOSIS — Z86.0100 HISTORY OF COLON POLYPS: ICD-10-CM

## 2025-04-11 DIAGNOSIS — Z12.9 CANCER SCREENING: ICD-10-CM

## 2025-04-11 DIAGNOSIS — Z01.89 VISIT FOR BLOOD TEST: Primary | ICD-10-CM

## 2025-04-11 DIAGNOSIS — E78.00 HYPERCHOLESTEREMIA: ICD-10-CM

## 2025-04-11 DIAGNOSIS — Z83.3 FAMILY HISTORY OF DIABETES MELLITUS: ICD-10-CM

## 2025-04-11 DIAGNOSIS — R73.03 PREDIABETES: ICD-10-CM

## 2025-04-11 PROCEDURE — 3078F DIAST BP <80 MM HG: CPT | Performed by: STUDENT IN AN ORGANIZED HEALTH CARE EDUCATION/TRAINING PROGRAM

## 2025-04-11 PROCEDURE — 99213 OFFICE O/P EST LOW 20 MIN: CPT | Performed by: STUDENT IN AN ORGANIZED HEALTH CARE EDUCATION/TRAINING PROGRAM

## 2025-04-11 PROCEDURE — 3074F SYST BP LT 130 MM HG: CPT | Performed by: STUDENT IN AN ORGANIZED HEALTH CARE EDUCATION/TRAINING PROGRAM

## 2025-04-11 PROCEDURE — 3008F BODY MASS INDEX DOCD: CPT | Performed by: STUDENT IN AN ORGANIZED HEALTH CARE EDUCATION/TRAINING PROGRAM

## 2025-04-11 NOTE — PROGRESS NOTES
Subjective:      Chief Complaint   Patient presents with    Lab Results     Here to discuss cancer screening test results     HISTORY OF PRESENT ILLNESS  HPI  HPI obtained per patient report.  Antonio Mcpherson is a pleasant 49 year old male presenting to discuss his recent test results.   He is here with his partner today.   He recently completed blood tests and an annual physical through his employer. He is a  and completed routine annual labs including CBC with differential, CMP, lipid panel, and A1C, as well as a peripheral blood test for \"cancer screening\". He presents with a copy of his results today.     PAST PATIENT HISTORY  Past Medical History[1]  Past Surgical History[2]    CURRENT MEDICATIONS  Medications Taking[3]    HEALTH MAINTENANCE  Immunization History   Administered Date(s) Administered    None   Deferred Date(s) Deferred    Influenza Vaccine, trivalent (IIV3), PF 0.5mL (71621) 10/04/2024       ALLERGIES AND DRUG REACTIONS  Allergies[4]    Family History[5]  Short Social Hx on File[6]    Review of Systems   All other systems reviewed and are negative.         Objective:      /70   Pulse 64   Temp 97.2 °F (36.2 °C) (Temporal)   Resp 16   Ht 6' (1.829 m)   Wt 195 lb 4 oz (88.6 kg)   SpO2 98%   BMI 26.48 kg/m²   Body mass index is 26.48 kg/m².    Physical Exam  Vitals reviewed.   Constitutional:       General: He is not in acute distress.     Appearance: He is not ill-appearing, toxic-appearing or diaphoretic.   HENT:      Head: Normocephalic and atraumatic.   Cardiovascular:      Rate and Rhythm: Normal rate.   Pulmonary:      Effort: Pulmonary effort is normal.   Abdominal:      General: Abdomen is flat.   Musculoskeletal:      Cervical back: Neck supple.      Right lower leg: No edema.      Left lower leg: No edema.   Neurological:      Mental Status: He is alert and oriented to person, place, and time.   Psychiatric:         Mood and Affect: Mood normal.            Assessment  and Plan:      1. Visit for blood test (Primary)  -     ONCOLOGY/HEMATOLOGY - INTERNAL  2. Cancer screening  -     ONCOLOGY/HEMATOLOGY - INTERNAL  3. History of colon polyps  4. Hypercholesteremia  5. Prediabetes  6. Family history of diabetes mellitus    No follow-ups on file.    - his lab results were reviewed and sent to scan. He completed a CBC with differential, CMP, lipid panel, and A1C through [facility] on 3/4/25. His CBC with diff and CMP were WNL. Lipid panel showed mildly elevated cholesterol and borderline elevated A1C. He was encouraged to reduce sugar, dairy, and processed food intake, increase his vegetable intake, and continue to exercise regularly. He also has a \"peripheral blood screening test result\" from \"cancer checklabs\" completed through the \"[employer]\" organization. This test was positive for atypical cells, specified as a \"few atypical cells with large irregular hyperchromatic nuclei and high N/C ratio\". We discussed that there is no evidence basis for this method of cancer screening to my knowledge. He is UTD on his evidence-based age-appropriate cancer screenings including colonoscopies. His most recent colonoscopy was completed 7/2023 and he has been recommended a 5 year recall due to the presence of adenomas. He is feeling well and denies any symptoms. 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 Albrecht       Patient verbalized understanding of assessment and recommendations. All questions and concerns were addressed.    Electronically signed by Caleb Suarez MD         [1]   Past Medical History:   Abdominal hernia    Back problem    CERVICAL AND LOWER BACK PAIN   [2]   Past Surgical History:  Procedure Laterality Date    Colonoscopy      Vasectomy  12/04/2014    Dr. Dye   [3]   Outpatient Medications Marked as Taking  for the 4/11/25 encounter (Office Visit) with Caleb Suarez MD   Medication Sig Dispense Refill    Vitamin C 500 MG Oral Tab Take 1 tablet (500 mg total) by mouth daily.      Multiple Vitamin (MULTI-VITAMIN OR) None Entered     [4]   Allergies  Allergen Reactions    Penicillins HIVES     EARLY 20S. NOT HOSPITALIZED. NO CARDIAC/RESPIRATORY DISTRESS. NO ORGAN DAMAGE NOTED.   [5]   Family History  Problem Relation Age of Onset    Diabetes Mother    [6]   Social History  Socioeconomic History    Marital status: Single   Tobacco Use    Smoking status: Never    Smokeless tobacco: Never   Vaping Use    Vaping status: Never Used   Substance and Sexual Activity    Alcohol use: Yes     Alcohol/week: 1.0 standard drink of alcohol     Types: 1 Standard drinks or equivalent per week     Comment: SOCIALLY    Drug use: No   Other Topics Concern    Caffeine Concern No    Stress Concern No    Weight Concern No    Special Diet No    Exercise Yes    Seat Belt Yes

## 2025-04-21 ENCOUNTER — TELEPHONE (OUTPATIENT)
Facility: LOCATION | Age: 50
End: 2025-04-21

## 2025-04-21 NOTE — TELEPHONE ENCOUNTER
Pt is calling to schedule a new consult appt.  Patient Name- Antonio Mcpherson  09/08/1975  Referred to -1st available in [place]   Referred by- Caleb Suarez  Reason- Visit for blood, cancer screening   [facility]  Please give pt a call back. Thank you

## 2025-05-01 NOTE — PROGRESS NOTES
[facility] Hematology and Oncology Clinic Note    Visit Diagnosis:  1. Abnormal laboratory test        History of Present Illness: 49M referred by Dr. Suarez to discuss abnormal results. He underwent blood testing with his work for a cancer screening via [address]--test noted a few atypical cells with large irregular hyperchromatic nuclei with a high N/C ratio.\"  Noted to have concern for adenocarcinoma, squamous cell or neuroendocrine. They recommended a 5 day fast without water, ivermectin and albendazole. He is up to date with colonoscopy. PSA normal. Works as a . NO smoking or ETOH abuse. Maternal Uncle with Colon cancer. Another maternal uncle with a head/neck cancer. Extended family members on mom side with cancer. No fevers nights sweats or weight loss. No new pain. No abdominal pain. Has had a couple issues with bronchitis in past 1-2 years. Mild laryngitis now.     We discussed that \"cancer blood tests\" are not an FDA approved screening method. However, given positive results we can consider further testing if covered by his insurance. Given bronchitis/laryngitis, will opt for a CT NCAP. Will ask GI if they can perform an EGD +/- EUS. Peripheral flow ordered given report of abnormal N/C ratio.     Review of Systems: 12 Point ROS was completed and pertinent positives are in the HPI    Medications Ordered Prior to Encounter[1]  Past Medical History[2]  Past Surgical History[3]  Set as collapsible by default.[4]   Family History[5]    Physical Exam  Height: 182.9 cm (6') (05/05 1058)  Weight: 88.7 kg (195 lb 8 oz) (05/05 1058)  BSA (Calculated - sq m): 2.11 sq meters (05/05 1058)  Pulse: 96 (05/05 1058)  BP: 124/81 (05/05 1058)  Temp: 97.3 °F (36.3 °C) (05/05 1058)  Do Not Use - Resp Rate: --  SpO2: 98 % (05/05 1058)    General: NAD, AOX3  HEENT: clear op, mmm, no jvd, no scleral icterus  LN: no supraclavicular, infraclavicular, axillary or inguinal LAD  CV: RRR S1S2 no murmurs  Extremities: No  edema   Lungs: CTAB, no increased work of breathing  Abd: soft nt nd +BS no hepatosplenomegaly  Neuro: CN: II-XII grossly intact    Results:  Lab Results   Component Value Date    WBC 4.5 06/12/2023    HGB 14.8 06/12/2023    HCT 45.3 06/12/2023    MCV 91.5 06/12/2023    .0 06/12/2023       No results for input(s): \"GLU\", \"BUN\", \"CREATSERUM\", \"GFRAA\", \"GFRNAA\", \"EGFRCR\", \"CA\", \"ALB\", \"NA\", \"K\", \"CL\", \"CO2\", \"ALKPHO\", \"AST\", \"ALT\", \"BILT\", \"TP\" in the last 168 hours.    Radiology: reviewed     Assessment and Plan:  We discussed that \"cancer blood tests\" are not an FDA approved screening method. However, given positive results we can consider further testing if covered by his insurance. Given bronchitis/laryngitis, will opt for a CT NCAP. Will ask GI if they can perform an EGD +/- EUS. Peripheral flow ordered given report of abnormal N/C ratio.     YAA Albrecht MD  [facility]         [1]   Current Outpatient Medications on File Prior to Visit   Medication Sig Dispense Refill    Vitamin C 500 MG Oral Tab Take 1 tablet (500 mg total) by mouth in the morning.      Multiple Vitamin (MULTI-VITAMIN OR) Take by mouth.       No current facility-administered medications on file prior to visit.   [2]   Past Medical History:   Abdominal hernia    Back problem    CERVICAL AND LOWER BACK PAIN   [3]   Past Surgical History:  Procedure Laterality Date    Colonoscopy      Hernia repair      October 2024    Vasectomy  12/04/2014    Dr. Dye   [4]   Social History  Socioeconomic History    Marital status: Single   Tobacco Use    Smoking status: Never    Smokeless tobacco: Never   Vaping Use    Vaping status: Never Used   Substance and Sexual Activity    Alcohol use: Yes     Alcohol/week: 1.0 standard drink of alcohol     Types: 1 Standard drinks or equivalent per week     Comment: SOCIALLY    Drug use: No   [5]   Family History  Problem Relation Age of Onset    Diabetes Mother     Cancer  Maternal Uncle         colon    Cancer Maternal Uncle         head and neck

## 2025-05-05 ENCOUNTER — OFFICE VISIT (OUTPATIENT)
Age: 50
End: 2025-05-05
Attending: INTERNAL MEDICINE
Payer: COMMERCIAL

## 2025-05-05 VITALS
HEART RATE: 96 BPM | DIASTOLIC BLOOD PRESSURE: 81 MMHG | RESPIRATION RATE: 16 BRPM | OXYGEN SATURATION: 98 % | TEMPERATURE: 97 F | SYSTOLIC BLOOD PRESSURE: 124 MMHG | WEIGHT: 195.5 LBS | BODY MASS INDEX: 26.48 KG/M2 | HEIGHT: 72 IN

## 2025-05-05 DIAGNOSIS — J04.0 LARYNGITIS: ICD-10-CM

## 2025-05-05 DIAGNOSIS — R89.9 ABNORMAL LABORATORY TEST: Primary | ICD-10-CM

## 2025-05-05 NOTE — PROGRESS NOTES
Patient here as a new consult for abnormal labs. Denies any pain, fatigue, nausea, weight loss, or other symptoms at this time. Up to date with colonoscopy.

## 2025-05-07 ENCOUNTER — HOSPITAL ENCOUNTER (OUTPATIENT)
Dept: CT IMAGING | Age: 50
Discharge: HOME | End: 2025-05-07
Attending: INTERNAL MEDICINE
Payer: COMMERCIAL

## 2025-05-07 DIAGNOSIS — R89.9 ABNORMAL LABORATORY TEST: ICD-10-CM

## 2025-05-07 DIAGNOSIS — J04.0 LARYNGITIS: ICD-10-CM

## 2025-05-07 PROCEDURE — 71260 CT THORAX DX C+: CPT | Performed by: INTERNAL MEDICINE

## 2025-05-07 PROCEDURE — 70491 CT SOFT TISSUE NECK W/DYE: CPT | Performed by: INTERNAL MEDICINE

## 2025-05-07 PROCEDURE — 74177 CT ABD & PELVIS W/CONTRAST: CPT | Performed by: INTERNAL MEDICINE

## 2025-05-12 LAB
CD10 CELLS NFR SPEC: <1 %
CD10/CD19: <1 %
CD19 CELLS NFR SPEC: 10 %
CD19+/CD200+: 7 %
CD2 CELLS NFR SPEC: 82 %
CD20 CELLS NFR SPEC: 10 %
CD200 CELLS: 20 %
CD3 CELLS NFR SPEC: 66 %
CD3+/TCRGD+: 1 %
CD3+CD4+ CELLS NFR SPEC: 44 %
CD3+CD4+ CELLS/CD3+CD8+ CLL SPEC: 2.1
CD3+CD8+ CELLS NFR SPEC: 21 %
CD3-/CD56+: 20 %
CD34 CELLS NFR SPEC: <1 %
CD38 CELLS NFR SPEC: 1 %
CD38+/CD19+: <1 %
CD45 CELLS NFR SPEC: 100 %
CD5 CELLS NFR SPEC: 68 %
CD5/CD19 CELLS: 2 %
CD7 CELLS NFR SPEC: 84 %
CELL SURF KAPPA/LAMBDA RATIO: 1.5
CELL SURF LAMBDA LIGHT CHAIN: 4 %
CELL SURFACE KAPPA LIGHT CHAIN: 6 %
TCR G-D CELLS NFR SPEC: 1 %

## (undated) DEVICE — ROBOTIC GENERAL: Brand: MEDLINE INDUSTRIES, INC.

## (undated) DEVICE — SKN PREP SPNG STKS PVP PNT STR: Brand: MEDLINE INDUSTRIES, INC.

## (undated) DEVICE — COVER,LIGHT,CAMERA,HARD,1/PK,STRL: Brand: MEDLINE

## (undated) DEVICE — FORCE BIPOLAR: Brand: ENDOWRIST

## (undated) DEVICE — 3M™ IOBAN™ 2 ANTIMICROBIAL INCISE DRAPE 6648EZ: Brand: IOBAN™ 2

## (undated) DEVICE — MONOPOLAR CURVED SCISSORS: Brand: ENDOWRIST

## (undated) DEVICE — COLUMN DRAPE

## (undated) DEVICE — SUT MCRYL 4-0 18IN PS-2 ABSRB UD 19MM 3/8 CIR

## (undated) DEVICE — TRAY CATH 16FR F INCL BARDX IC COMPLT CARE

## (undated) DEVICE — LAPAROVUE VISIBILITY SYSTEM LAPAROSCOPIC SOLUTIONS: Brand: LAPAROVUE

## (undated) DEVICE — ADHESIVE SKIN TOP FOR WND CLSR DERMBND ADV

## (undated) DEVICE — BLADELESS OBTURATOR: Brand: WECK VISTA

## (undated) DEVICE — TIP COVER ACCESSORY

## (undated) DEVICE — STERILE DRAPE FOR USE WITH SITUATE ROOM SCANNER: Brand: SITUATE

## (undated) DEVICE — GLOVE SUR 7 SENSICARE PI PIP CRM PWD F

## (undated) DEVICE — DRAPE,TOP,102X53,STERILE: Brand: MEDLINE

## (undated) DEVICE — ABSORBABLE WOUND CLOSURE DEVICE: Brand: V-LOC 90

## (undated) DEVICE — GLOVE SUR 6.5 SENSICARE PI PIP CRM PWD F

## (undated) DEVICE — GLOVE SUR 7.5 SENSICARE PI PIP GRN PWD F

## (undated) DEVICE — MEGA NEEDLE DRIVER: Brand: ENDOWRIST

## (undated) DEVICE — 40580 - THE PINK PAD - ADVANCED TRENDELENBURG POSITIONING KIT: Brand: 40580 - THE PINK PAD - ADVANCED TRENDELENBURG POSITIONING KIT

## (undated) DEVICE — SEAL

## (undated) DEVICE — ARM DRAPE

## (undated) DEVICE — ENDOPATH ULTRA VERESS INSUFFLATION NEEDLES WITH LUER LOCK CONNECTORS: Brand: ENDOPATH

## (undated) DEVICE — ANTIBACTERIAL UNDYED BRAIDED (POLYGLACTIN 910), SYNTHETIC ABSORBABLE SUTURE: Brand: COATED VICRYL

## (undated) NOTE — MR AVS SNAPSHOT
[facility], [address] [id number]               Thank you for choosing us for your health care visit with KRISTY Nicole.   We are glad to serve you and happy to provide you with this Diagnoses:  Herniation of cervical intervertebral disc with radiculopathy   Order:  Physical Therapy - Internal        Comment:  eval and treat     Right C5-6 HNP    Traction, Home traction unit          KRISTY Dominguez   [address] [place] [address] Instructions and Information about Your Health      Refill policies:    ? Allow 2 business days for refills; controlled substances may take longer. ?  Contact your pharmacy at least 5 days prior to running out of medication and have them send an electronic testing performed. [facility]) will contact your insurance carrier to obtain pre-certification or prior authorization.     Unfortunately, [facility] has seen an increase in denial of payment even though the procedure/test has been pre-cert your Zip Code and Date of Birth to complete the sign-up process. If you do not sign up before the expiration date, you must request a new code.     Your unique [facility] Access Code: Darshana Velazquez  Expires: 7/16/2017 11:26 AM    If you have questions, you can c

## (undated) NOTE — Clinical Note
2017          Ashley Lomeli  :  1975      To Whom It May Concern: This patient was seen in our office on 2017 .   Work status:  Off work until [employer] in 2-3 weeks    If this office may be of further assistance, please do not hesitate

## (undated) NOTE — Clinical Note
Hey this christina had a blood test at work that said he might have cancer--these tests aren't validated...  But, his colonoscopy was normal with you in 2023. Can you guys see him for an EGD +/- EUS?  Thank you!

## (undated) NOTE — MR AVS SNAPSHOT
[facility], [place] [id number] [facility], [address] [place] [address] [phone]               Thank you for choosing us for your health care visit with Doreen Wilson MD.  We are glad to serve you and happy to provide you with this ? If your prescription is due for a refill, you may be due for a follow up appointment. ? To best provide you care, patients receiving routine medications need to be seen at least once a year.      protocol for controlled substances:  Written prescr Allergies as of Jun 01, 2017     Penicillins                 Today's Vital Signs     BP Pulse                120/80 mmHg 88             Current Medications          This list is accurate as of: 6/1/17  3:31 PM.  Always use your most recent med list.

## (undated) NOTE — LETTER
11/22/24      RE:  ANTONIO BAGLEY  [id number] [facility] [address]       To Whom It May Concern:    Antonio Bagley is a patient under my medical care.  He is cleared to return to work on 12/20/24, without restriction.    Thank you.      Bessy Courtney MD

## (undated) NOTE — LETTER
17          Douglas Luke  :  1975      To Whom It May Concern: This patient was seen in our office on 17 . 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 May return to work status per above effective 17.

## (undated) NOTE — Clinical Note
06/01/2017      To Whom It May Concern:    Vic Rahman was seen in our office on 6/1/2017. Work status is as follows:    X  Off work until re-evaluated at next appointment in one month  ? Off work, temporary total disability  ?    May return to work p

## (undated) NOTE — LETTER
17          Sejal Parks  :  1975      To Whom It May Concern: This patient was seen in our office on 17 . Work status:  Full Duty    If this office may be of further assistance, please do not hesitate to contact us.       Sincerely